# Patient Record
Sex: MALE | Race: BLACK OR AFRICAN AMERICAN
[De-identification: names, ages, dates, MRNs, and addresses within clinical notes are randomized per-mention and may not be internally consistent; named-entity substitution may affect disease eponyms.]

---

## 2019-12-29 ENCOUNTER — HOSPITAL ENCOUNTER (INPATIENT)
Dept: HOSPITAL 8 - NICU
LOS: 61 days | Discharge: ANOTHER HOSPITAL | End: 2020-02-28
Attending: PEDIATRICS | Admitting: PEDIATRICS
Payer: MEDICAID

## 2019-12-29 DIAGNOSIS — Q33.6: ICD-10-CM

## 2019-12-29 DIAGNOSIS — Q78.9: ICD-10-CM

## 2019-12-29 DIAGNOSIS — Q21.1: ICD-10-CM

## 2019-12-29 DIAGNOSIS — Q65.89: ICD-10-CM

## 2019-12-29 DIAGNOSIS — Q25.0: ICD-10-CM

## 2019-12-29 DIAGNOSIS — Q82.8: ICD-10-CM

## 2019-12-29 PROCEDURE — 80047 BASIC METABLC PNL IONIZED CA: CPT

## 2019-12-29 PROCEDURE — 36415 COLL VENOUS BLD VENIPUNCTURE: CPT

## 2019-12-29 PROCEDURE — 94660 CPAP INITIATION&MGMT: CPT

## 2019-12-29 PROCEDURE — A9541 TC99M SULFUR COLLOID: HCPCS

## 2019-12-29 PROCEDURE — 84295 ASSAY OF SERUM SODIUM: CPT

## 2019-12-29 PROCEDURE — 80076 HEPATIC FUNCTION PANEL: CPT

## 2019-12-29 PROCEDURE — 84132 ASSAY OF SERUM POTASSIUM: CPT

## 2019-12-29 PROCEDURE — 81229 CYTOG ALYS CHRML ABNR SNPCGH: CPT

## 2019-12-29 PROCEDURE — 83735 ASSAY OF MAGNESIUM: CPT

## 2019-12-29 PROCEDURE — 88289 CHROMOSOME STUDY ADDITIONAL: CPT

## 2019-12-29 PROCEDURE — 87186 SC STD MICRODIL/AGAR DIL: CPT

## 2019-12-29 PROCEDURE — 82248 BILIRUBIN DIRECT: CPT

## 2019-12-29 PROCEDURE — 84478 ASSAY OF TRIGLYCERIDES: CPT

## 2019-12-29 PROCEDURE — 93325 DOPPLER ECHO COLOR FLOW MAPG: CPT

## 2019-12-29 PROCEDURE — 72146 MRI CHEST SPINE W/O DYE: CPT

## 2019-12-29 PROCEDURE — 85014 HEMATOCRIT: CPT

## 2019-12-29 PROCEDURE — 74018 RADEX ABDOMEN 1 VIEW: CPT

## 2019-12-29 PROCEDURE — 87205 SMEAR GRAM STAIN: CPT

## 2019-12-29 PROCEDURE — 87081 CULTURE SCREEN ONLY: CPT

## 2019-12-29 PROCEDURE — 87077 CULTURE AEROBIC IDENTIFY: CPT

## 2019-12-29 PROCEDURE — 71045 X-RAY EXAM CHEST 1 VIEW: CPT

## 2019-12-29 PROCEDURE — 93321 DOPPLER ECHO F-UP/LMTD STD: CPT

## 2019-12-29 PROCEDURE — 82330 ASSAY OF CALCIUM: CPT

## 2019-12-29 PROCEDURE — 72040 X-RAY EXAM NECK SPINE 2-3 VW: CPT

## 2019-12-29 PROCEDURE — 82247 BILIRUBIN TOTAL: CPT

## 2019-12-29 PROCEDURE — 80048 BASIC METABOLIC PNL TOTAL CA: CPT

## 2019-12-29 PROCEDURE — 78264 GASTRIC EMPTYING IMG STUDY: CPT

## 2019-12-29 PROCEDURE — 82947 ASSAY GLUCOSE BLOOD QUANT: CPT

## 2019-12-29 PROCEDURE — 84075 ASSAY ALKALINE PHOSPHATASE: CPT

## 2019-12-29 PROCEDURE — 88262 CHROMOSOME ANALYSIS 15-20: CPT

## 2019-12-29 PROCEDURE — 82803 BLOOD GASES ANY COMBINATION: CPT

## 2019-12-29 PROCEDURE — 86756 RESPIRATORY VIRUS ANTIBODY: CPT

## 2019-12-29 PROCEDURE — 85025 COMPLETE CBC W/AUTO DIFF WBC: CPT

## 2019-12-29 PROCEDURE — 84100 ASSAY OF PHOSPHORUS: CPT

## 2019-12-29 PROCEDURE — 80307 DRUG TEST PRSMV CHEM ANLYZR: CPT

## 2019-12-29 PROCEDURE — 70551 MRI BRAIN STEM W/O DYE: CPT

## 2019-12-29 PROCEDURE — 87147 CULTURE TYPE IMMUNOLOGIC: CPT

## 2019-12-29 PROCEDURE — 82040 ASSAY OF SERUM ALBUMIN: CPT

## 2019-12-29 PROCEDURE — 87070 CULTURE OTHR SPECIMN AEROBIC: CPT

## 2019-12-29 PROCEDURE — 88230 TISSUE CULTURE LYMPHOCYTE: CPT

## 2019-12-29 PROCEDURE — 74240 X-RAY XM UPR GI TRC 1CNTRST: CPT

## 2019-12-29 PROCEDURE — 82962 GLUCOSE BLOOD TEST: CPT

## 2019-12-29 PROCEDURE — 93303 ECHO TRANSTHORACIC: CPT

## 2019-12-29 PROCEDURE — 76506 ECHO EXAM OF HEAD: CPT

## 2019-12-29 PROCEDURE — 70250 X-RAY EXAM OF SKULL: CPT

## 2019-12-29 PROCEDURE — 76700 US EXAM ABDOM COMPLETE: CPT

## 2020-01-01 VITALS — DIASTOLIC BLOOD PRESSURE: 25 MMHG | SYSTOLIC BLOOD PRESSURE: 53 MMHG

## 2020-01-01 VITALS — DIASTOLIC BLOOD PRESSURE: 49 MMHG | SYSTOLIC BLOOD PRESSURE: 84 MMHG

## 2020-01-01 LAB
<PLATELET ESTIMATE>: (no result)
<PLATELET ESTIMATE>: ADEQUATE
<PLT MORPHOLOGY>: (no result)
ALBUMIN SERPL-MCNC: 2.4 G/DL (ref 3.4–5)
ALBUMIN SERPL-MCNC: 2.5 G/DL (ref 3.4–5)
ALBUMIN SERPL-MCNC: 2.5 G/DL (ref 3.4–5)
ALBUMIN SERPL-MCNC: 2.6 G/DL (ref 3.4–5)
ALBUMIN SERPL-MCNC: 2.7 G/DL (ref 3.4–5)
ALBUMIN SERPL-MCNC: 3 G/DL (ref 3.4–5)
ALBUMIN SERPL-MCNC: 3.3 G/DL (ref 3.4–5)
ALP SERPL-CCNC: 191 U/L (ref 45–800)
ALP SERPL-CCNC: 201 U/L (ref 45–800)
ALP SERPL-CCNC: 225 U/L (ref 45–800)
ALP SERPL-CCNC: 227 U/L (ref 45–800)
ALP SERPL-CCNC: 243 U/L (ref 45–800)
ALP SERPL-CCNC: 298 U/L (ref 45–800)
ALP SERPL-CCNC: 393 U/L (ref 45–800)
ALT SERPL-CCNC: 14 U/L (ref 12–78)
ANION GAP SERPL CALC-SCNC: 3 MMOL/L (ref 5–15)
ANION GAP SERPL CALC-SCNC: 4 MMOL/L (ref 5–15)
ANION GAP SERPL CALC-SCNC: 4 MMOL/L (ref 5–15)
ANION GAP SERPL CALC-SCNC: 7 MMOL/L (ref 5–15)
ANISOCYTOSIS BLD QL SMEAR: (no result)
ANISOCYTOSIS BLD QL SMEAR: (no result)
BAND#(MANUAL): 0.38 X10^3/UL
BAND#(MANUAL): 0.5 X10^3/UL
BASOPHILS NFR BLD MANUAL: 1 % (ref 0–1)
BASOS#(MANUAL): 0.19 X10^3/UL (ref 0–0.6)
BILIRUB SERPL-MCNC: 0.3 MG/DL (ref 0.2–1)
BILIRUB SERPL-MCNC: 0.3 MG/DL (ref 0.2–1)
BILIRUB SERPL-MCNC: 11.7 MG/DL (ref 0.1–10)
BILIRUB SERPL-MCNC: 5 MG/DL (ref 0.1–10)
BILIRUB SERPL-MCNC: 7.2 MG/DL (ref 0.1–10)
BILIRUB SERPL-MCNC: 7.7 MG/DL (ref 0.1–10)
BILIRUB SERPL-MCNC: 9.1 MG/DL (ref 0.1–10)
CALCIUM SERPL-MCNC: 10 MG/DL (ref 8.5–10.1)
CALCIUM SERPL-MCNC: 10.3 MG/DL (ref 8.5–10.1)
CALCIUM SERPL-MCNC: 8.4 MG/DL (ref 8.5–10.1)
CALCIUM SERPL-MCNC: 9.1 MG/DL (ref 8.5–10.1)
CALCIUM SERPL-MCNC: 9.4 MG/DL (ref 8.5–10.1)
CALCIUM SERPL-MCNC: 9.4 MG/DL (ref 8.5–10.1)
CHLORIDE SERPL-SCNC: 103 MMOL/L (ref 98–107)
CHLORIDE SERPL-SCNC: 110 MMOL/L (ref 98–107)
CHLORIDE SERPL-SCNC: 111 MMOL/L (ref 98–107)
CHLORIDE SERPL-SCNC: 112 MMOL/L (ref 98–107)
CHLORIDE SERPL-SCNC: 115 MMOL/L (ref 98–107)
CHLORIDE SERPL-SCNC: 97 MMOL/L (ref 98–107)
CREAT SERPL-MCNC: < 0.15 MG/DL (ref 0.7–1.3)
EOS#(MANUAL): 0.75 X10^3/UL (ref 0.4–1.1)
EOS#(MANUAL): 1.15 X10^3/UL (ref 0–0.9)
EOS% (MANUAL): 3 % (ref 1–7)
EOS% (MANUAL): 6 % (ref 1–7)
ERYTHROCYTE [DISTWIDTH] IN BLOOD BY AUTOMATED COUNT: 16.3 % (ref 9.4–14.8)
ERYTHROCYTE [DISTWIDTH] IN BLOOD BY AUTOMATED COUNT: 20.4 % (ref 13.9–17.4)
LG PLATELETS BLD QL SMEAR: (no result)
LYMPH#(MANUAL): 6.28 X10^3/UL (ref 2–17)
LYMPH#(MANUAL): 7.64 X10^3/UL (ref 2–12)
LYMPHS% (MANUAL): 25 % (ref 45–75)
LYMPHS% (MANUAL): 40 % (ref 28–48)
MACROCYTES BLD QL SMEAR: (no result)
MCH RBC QN AUTO: 29.7 PG (ref 27.5–34.5)
MCH RBC QN AUTO: 31.8 PG (ref 32.6–37.6)
MCHC RBC AUTO-ENTMCNC: 32.4 G/DL (ref 31.8–34.8)
MCHC RBC AUTO-ENTMCNC: 32.9 G/DL (ref 33.2–36.2)
MCV RBC AUTO: 90.1 FL (ref 89–90)
MCV RBC AUTO: 97.9 FL (ref 99–110)
MD: YES
MD: YES
METAMYELOCYTES# (MANUAL): 0.19 X10^3/UL (ref 0–0)
METAMYELOCYTES% (MANUAL): 1 % (ref 0–1)
MONOS#(MANUAL): 1.53 X10^3/UL (ref 0.4–3.1)
MONOS#(MANUAL): 2.76 X10^3/UL (ref 0.3–2.7)
MONOS% (MANUAL): 11 % (ref 2–9)
MONOS% (MANUAL): 8 % (ref 2–9)
NEUTS BAND NFR BLD: 2 % (ref 0–7)
NEUTS BAND NFR BLD: 2 % (ref 0–7)
NRBC % (MANUAL): 3 % (ref 0–1)
OVALOCYTES BLD QL SMEAR: (no result)
PLATELET # BLD AUTO: 324 X10^3/UL (ref 130–400)
PLATELET # BLD AUTO: 476 X10^3/UL (ref 130–400)
PMV BLD AUTO: 7.6 FL (ref 7.4–10.4)
PMV BLD AUTO: 8.4 FL (ref 7.4–10.4)
POLYCHROMASIA BLD QL SMEAR: (no result)
PROT SERPL-MCNC: 4.8 G/DL (ref 6.4–8.2)
RBC # BLD AUTO: 4.21 X10^6/UL (ref 3.8–5.6)
RBC # BLD AUTO: 4.58 X10^6/UL (ref 4.47–5.95)
SCHISTOCYTES BLD QL SMEAR: (no result)
SEG#(MANUAL): 14.81 X10^3/UL (ref 1–10)
SEG#(MANUAL): 8.02 X10^3/UL (ref 5–28)
SEGS% (MANUAL): 42 % (ref 35–65)
SEGS% (MANUAL): 59 % (ref 15–35)
TARGETS BLD QL SMEAR: (no result)
TRIGL SERPL-MCNC: 116 MG/DL (ref 50–200)
TRIGL SERPL-MCNC: 25 MG/DL (ref 50–200)
TRIGL SERPL-MCNC: 31 MG/DL (ref 50–200)
TRIGL SERPL-MCNC: 65 MG/DL (ref 50–200)
TRIGL SERPL-MCNC: 86 MG/DL (ref 50–200)
TRIGL SERPL-MCNC: 88 MG/DL (ref 50–200)

## 2020-01-01 PROCEDURE — 02HV33Z INSERTION OF INFUSION DEVICE INTO SUPERIOR VENA CAVA, PERCUTANEOUS APPROACH: ICD-10-PCS | Performed by: PEDIATRICS

## 2020-01-01 PROCEDURE — 5A09557 ASSISTANCE WITH RESPIRATORY VENTILATION, GREATER THAN 96 CONSECUTIVE HOURS, CONTINUOUS POSITIVE AIRWAY PRESSURE: ICD-10-PCS | Performed by: PEDIATRICS

## 2020-01-01 PROCEDURE — 02H633Z INSERTION OF INFUSION DEVICE INTO RIGHT ATRIUM, PERCUTANEOUS APPROACH: ICD-10-PCS | Performed by: RADIOLOGY

## 2020-01-01 RX ADMIN — SODIUM CHLORIDE SCH MEQ: 234 INJECTION, SOLUTION, CONCENTRATE INTRAVENOUS at 05:36

## 2020-01-01 RX ADMIN — ACETAMINOPHEN PRN MG: 160 SOLUTION ORAL at 03:57

## 2020-01-01 RX ADMIN — SODIUM CHLORIDE SCH ML: 450 INJECTION, SOLUTION INTRAVENOUS at 17:30

## 2020-01-01 RX ADMIN — BACITRACIN ZINC AND POLYMYXIN B SULFATES SCH APPLIC: 500; 10000 OINTMENT OPHTHALMIC at 06:29

## 2020-01-01 RX ADMIN — SODIUM CHLORIDE, PRESERVATIVE FREE SCH ML: 5 INJECTION INTRAVENOUS at 08:13

## 2020-01-01 RX ADMIN — Medication SCH MLS/HR: at 17:07

## 2020-01-01 RX ADMIN — SODIUM CHLORIDE SCH MEQ: 234 INJECTION, SOLUTION, CONCENTRATE INTRAVENOUS at 11:37

## 2020-01-01 RX ADMIN — SODIUM CHLORIDE SCH MEQ: 234 INJECTION, SOLUTION, CONCENTRATE INTRAVENOUS at 17:00

## 2020-01-01 RX ADMIN — SODIUM CHLORIDE, PRESERVATIVE FREE SCH ML: 5 INJECTION INTRAVENOUS at 20:26

## 2020-01-01 RX ADMIN — Medication SCH MLS/HR: at 16:14

## 2020-01-01 RX ADMIN — Medication SCH MLS/HR: at 14:47

## 2020-01-01 RX ADMIN — PEDIATRIC MULTIPLE VITAMINS W/ IRON DROPS 10 MG/ML SCH ML: 10 SOLUTION at 08:54

## 2020-01-01 RX ADMIN — ACETAMINOPHEN PRN MG: 160 SOLUTION ORAL at 16:16

## 2020-01-01 RX ADMIN — SODIUM CHLORIDE SCH MEQ: 234 INJECTION, SOLUTION, CONCENTRATE INTRAVENOUS at 05:31

## 2020-01-01 RX ADMIN — BACITRACIN ZINC AND POLYMYXIN B SULFATES SCH APPLIC: 500; 10000 OINTMENT OPHTHALMIC at 00:11

## 2020-01-01 RX ADMIN — Medication SCH MLS/HR: at 12:45

## 2020-01-01 RX ADMIN — SODIUM CHLORIDE SCH MEQ: 234 INJECTION, SOLUTION, CONCENTRATE INTRAVENOUS at 00:30

## 2020-01-01 RX ADMIN — PEDIATRIC MULTIPLE VITAMINS W/ IRON DROPS 10 MG/ML SCH ML: 10 SOLUTION at 08:38

## 2020-01-01 RX ADMIN — PEDIATRIC MULTIPLE VITAMINS W/ IRON DROPS 10 MG/ML SCH ML: 10 SOLUTION at 08:51

## 2020-01-01 RX ADMIN — SODIUM CHLORIDE, PRESERVATIVE FREE SCH ML: 5 INJECTION INTRAVENOUS at 06:13

## 2020-01-01 RX ADMIN — SODIUM CHLORIDE SCH MEQ: 234 INJECTION, SOLUTION, CONCENTRATE INTRAVENOUS at 23:42

## 2020-01-01 RX ADMIN — SODIUM CHLORIDE SCH MEQ: 234 INJECTION, SOLUTION, CONCENTRATE INTRAVENOUS at 11:10

## 2020-01-01 RX ADMIN — SODIUM CHLORIDE SCH MEQ: 234 INJECTION, SOLUTION, CONCENTRATE INTRAVENOUS at 22:29

## 2020-01-01 RX ADMIN — Medication SCH MLS/HR: at 14:38

## 2020-01-01 RX ADMIN — SODIUM CHLORIDE, PRESERVATIVE FREE SCH ML: 5 INJECTION INTRAVENOUS at 13:54

## 2020-01-01 RX ADMIN — Medication SCH MLS/HR: at 09:30

## 2020-01-01 RX ADMIN — SODIUM CHLORIDE SCH MEQ: 234 INJECTION, SOLUTION, CONCENTRATE INTRAVENOUS at 05:45

## 2020-01-01 RX ADMIN — BACITRACIN ZINC AND POLYMYXIN B SULFATES SCH APPLIC: 500; 10000 OINTMENT OPHTHALMIC at 17:41

## 2020-01-01 RX ADMIN — SODIUM CHLORIDE, PRESERVATIVE FREE SCH ML: 5 INJECTION INTRAVENOUS at 06:58

## 2020-01-01 RX ADMIN — SODIUM CHLORIDE, PRESERVATIVE FREE SCH ML: 5 INJECTION INTRAVENOUS at 08:05

## 2020-01-01 RX ADMIN — PEDIATRIC MULTIPLE VITAMINS W/ IRON DROPS 10 MG/ML SCH ML: 10 SOLUTION at 08:42

## 2020-01-01 RX ADMIN — SODIUM CHLORIDE, PRESERVATIVE FREE SCH ML: 5 INJECTION INTRAVENOUS at 17:30

## 2020-01-01 RX ADMIN — SODIUM CHLORIDE SCH MEQ: 234 INJECTION, SOLUTION, CONCENTRATE INTRAVENOUS at 23:47

## 2020-01-01 RX ADMIN — SODIUM CHLORIDE SCH MEQ: 234 INJECTION, SOLUTION, CONCENTRATE INTRAVENOUS at 17:30

## 2020-01-01 RX ADMIN — SODIUM CHLORIDE SCH MEQ: 234 INJECTION, SOLUTION, CONCENTRATE INTRAVENOUS at 11:30

## 2020-01-01 RX ADMIN — SODIUM CHLORIDE SCH MEQ: 234 INJECTION, SOLUTION, CONCENTRATE INTRAVENOUS at 11:51

## 2020-01-01 RX ADMIN — BACITRACIN ZINC AND POLYMYXIN B SULFATES SCH APPLIC: 500; 10000 OINTMENT OPHTHALMIC at 05:42

## 2020-01-01 RX ADMIN — SODIUM CHLORIDE SCH MEQ: 234 INJECTION, SOLUTION, CONCENTRATE INTRAVENOUS at 11:57

## 2020-01-01 RX ADMIN — SODIUM CHLORIDE, PRESERVATIVE FREE SCH ML: 5 INJECTION INTRAVENOUS at 15:16

## 2020-01-01 RX ADMIN — SODIUM CHLORIDE, PRESERVATIVE FREE SCH ML: 5 INJECTION INTRAVENOUS at 20:09

## 2020-01-01 RX ADMIN — SODIUM CHLORIDE, PRESERVATIVE FREE SCH ML: 5 INJECTION INTRAVENOUS at 11:30

## 2020-01-01 RX ADMIN — SODIUM CHLORIDE SCH MEQ: 234 INJECTION, SOLUTION, CONCENTRATE INTRAVENOUS at 11:42

## 2020-01-01 RX ADMIN — SODIUM CHLORIDE SCH MEQ: 234 INJECTION, SOLUTION, CONCENTRATE INTRAVENOUS at 05:42

## 2020-01-01 RX ADMIN — SODIUM CHLORIDE, PRESERVATIVE FREE SCH ML: 5 INJECTION INTRAVENOUS at 23:32

## 2020-01-01 RX ADMIN — PEDIATRIC MULTIPLE VITAMINS W/ IRON DROPS 10 MG/ML SCH ML: 10 SOLUTION at 08:21

## 2020-01-01 RX ADMIN — SODIUM CHLORIDE SCH MEQ: 234 INJECTION, SOLUTION, CONCENTRATE INTRAVENOUS at 23:41

## 2020-01-01 RX ADMIN — SMOFLIPID SCH MLS/HR: 6; 6; 5; 3 INJECTION, EMULSION INTRAVENOUS at 16:08

## 2020-01-01 RX ADMIN — BACITRACIN ZINC AND POLYMYXIN B SULFATES SCH APPLIC: 500; 10000 OINTMENT OPHTHALMIC at 11:42

## 2020-01-01 RX ADMIN — ACETAMINOPHEN PRN MG: 160 SOLUTION ORAL at 05:05

## 2020-01-01 RX ADMIN — PEDIATRIC MULTIPLE VITAMINS W/ IRON DROPS 10 MG/ML SCH ML: 10 SOLUTION at 08:11

## 2020-01-01 RX ADMIN — Medication PRN EACH: at 14:46

## 2020-01-01 RX ADMIN — Medication SCH MLS/HR: at 16:07

## 2020-01-01 RX ADMIN — SODIUM CHLORIDE SCH MEQ: 234 INJECTION, SOLUTION, CONCENTRATE INTRAVENOUS at 11:41

## 2020-01-01 RX ADMIN — Medication PRN EACH: at 17:07

## 2020-01-01 RX ADMIN — SODIUM CHLORIDE SCH MEQ: 234 INJECTION, SOLUTION, CONCENTRATE INTRAVENOUS at 00:10

## 2020-01-01 RX ADMIN — PEDIATRIC MULTIPLE VITAMINS W/ IRON DROPS 10 MG/ML SCH ML: 10 SOLUTION at 08:45

## 2020-01-01 RX ADMIN — PEDIATRIC MULTIPLE VITAMINS W/ IRON DROPS 10 MG/ML SCH ML: 10 SOLUTION at 08:24

## 2020-01-01 RX ADMIN — BACITRACIN ZINC AND POLYMYXIN B SULFATES SCH APPLIC: 500; 10000 OINTMENT OPHTHALMIC at 01:00

## 2020-01-01 RX ADMIN — SODIUM CHLORIDE, PRESERVATIVE FREE SCH ML: 5 INJECTION INTRAVENOUS at 21:18

## 2020-01-01 RX ADMIN — BACITRACIN ZINC AND POLYMYXIN B SULFATES SCH APPLIC: 500; 10000 OINTMENT OPHTHALMIC at 11:48

## 2020-01-01 RX ADMIN — BACITRACIN ZINC AND POLYMYXIN B SULFATES SCH APPLIC: 500; 10000 OINTMENT OPHTHALMIC at 05:31

## 2020-01-01 RX ADMIN — PEDIATRIC MULTIPLE VITAMINS W/ IRON DROPS 10 MG/ML SCH ML: 10 SOLUTION at 09:19

## 2020-01-01 RX ADMIN — SMOFLIPID SCH MLS/HR: 6; 6; 5; 3 INJECTION, EMULSION INTRAVENOUS at 17:43

## 2020-01-01 RX ADMIN — SODIUM CHLORIDE SCH MEQ: 234 INJECTION, SOLUTION, CONCENTRATE INTRAVENOUS at 00:56

## 2020-01-01 RX ADMIN — ACETAMINOPHEN PRN MG: 160 SOLUTION ORAL at 21:18

## 2020-01-01 RX ADMIN — SODIUM CHLORIDE SCH MEQ: 234 INJECTION, SOLUTION, CONCENTRATE INTRAVENOUS at 11:34

## 2020-01-01 RX ADMIN — SMOFLIPID SCH MLS/HR: 6; 6; 5; 3 INJECTION, EMULSION INTRAVENOUS at 17:07

## 2020-01-01 RX ADMIN — SODIUM CHLORIDE SCH ML: 450 INJECTION, SOLUTION INTRAVENOUS at 11:30

## 2020-01-01 RX ADMIN — BACITRACIN ZINC AND POLYMYXIN B SULFATES SCH APPLIC: 500; 10000 OINTMENT OPHTHALMIC at 00:24

## 2020-01-01 RX ADMIN — PEDIATRIC MULTIPLE VITAMINS W/ IRON DROPS 10 MG/ML SCH ML: 10 SOLUTION at 09:01

## 2020-01-01 RX ADMIN — SODIUM CHLORIDE SCH MEQ: 234 INJECTION, SOLUTION, CONCENTRATE INTRAVENOUS at 17:35

## 2020-01-01 RX ADMIN — Medication SCH MLS/HR: at 13:49

## 2020-01-01 RX ADMIN — SODIUM CHLORIDE SCH MEQ: 234 INJECTION, SOLUTION, CONCENTRATE INTRAVENOUS at 17:26

## 2020-01-01 RX ADMIN — BACITRACIN ZINC AND POLYMYXIN B SULFATES SCH APPLIC: 500; 10000 OINTMENT OPHTHALMIC at 18:01

## 2020-01-01 RX ADMIN — BACITRACIN ZINC AND POLYMYXIN B SULFATES SCH APPLIC: 500; 10000 OINTMENT OPHTHALMIC at 08:30

## 2020-01-01 RX ADMIN — SODIUM CHLORIDE SCH MEQ: 234 INJECTION, SOLUTION, CONCENTRATE INTRAVENOUS at 05:00

## 2020-01-01 RX ADMIN — SODIUM CHLORIDE, PRESERVATIVE FREE SCH ML: 5 INJECTION INTRAVENOUS at 03:40

## 2020-01-01 RX ADMIN — SODIUM CHLORIDE, PRESERVATIVE FREE SCH ML: 5 INJECTION INTRAVENOUS at 21:49

## 2020-01-01 RX ADMIN — BACITRACIN ZINC AND POLYMYXIN B SULFATES SCH APPLIC: 500; 10000 OINTMENT OPHTHALMIC at 21:26

## 2020-01-01 RX ADMIN — SODIUM CHLORIDE, PRESERVATIVE FREE SCH ML: 5 INJECTION INTRAVENOUS at 07:55

## 2020-01-01 RX ADMIN — SODIUM CHLORIDE SCH MEQ: 234 INJECTION, SOLUTION, CONCENTRATE INTRAVENOUS at 18:01

## 2020-01-01 RX ADMIN — PEDIATRIC MULTIPLE VITAMINS W/ IRON DROPS 10 MG/ML SCH ML: 10 SOLUTION at 08:43

## 2020-01-01 RX ADMIN — SODIUM CHLORIDE, PRESERVATIVE FREE SCH ML: 5 INJECTION INTRAVENOUS at 02:16

## 2020-01-01 RX ADMIN — SODIUM CHLORIDE SCH MEQ: 234 INJECTION, SOLUTION, CONCENTRATE INTRAVENOUS at 11:21

## 2020-01-01 RX ADMIN — SODIUM CHLORIDE SCH MEQ: 234 INJECTION, SOLUTION, CONCENTRATE INTRAVENOUS at 11:17

## 2020-01-01 RX ADMIN — PEDIATRIC MULTIPLE VITAMINS W/ IRON DROPS 10 MG/ML SCH ML: 10 SOLUTION at 08:12

## 2020-01-01 RX ADMIN — PEDIATRIC MULTIPLE VITAMINS W/ IRON DROPS 10 MG/ML SCH ML: 10 SOLUTION at 08:33

## 2020-01-01 RX ADMIN — BACITRACIN ZINC AND POLYMYXIN B SULFATES SCH APPLIC: 500; 10000 OINTMENT OPHTHALMIC at 11:41

## 2020-01-01 RX ADMIN — PEDIATRIC MULTIPLE VITAMINS W/ IRON DROPS 10 MG/ML SCH ML: 10 SOLUTION at 10:15

## 2020-01-01 RX ADMIN — PEDIATRIC MULTIPLE VITAMINS W/ IRON DROPS 10 MG/ML SCH ML: 10 SOLUTION at 08:49

## 2020-01-01 RX ADMIN — SODIUM CHLORIDE SCH MEQ: 234 INJECTION, SOLUTION, CONCENTRATE INTRAVENOUS at 05:23

## 2020-01-01 RX ADMIN — SODIUM CHLORIDE SCH MEQ: 234 INJECTION, SOLUTION, CONCENTRATE INTRAVENOUS at 23:16

## 2020-01-01 RX ADMIN — PEDIATRIC MULTIPLE VITAMINS W/ IRON DROPS 10 MG/ML SCH ML: 10 SOLUTION at 08:39

## 2020-01-01 RX ADMIN — SODIUM CHLORIDE, PRESERVATIVE FREE SCH ML: 5 INJECTION INTRAVENOUS at 20:44

## 2020-01-01 RX ADMIN — PEDIATRIC MULTIPLE VITAMINS W/ IRON DROPS 10 MG/ML SCH ML: 10 SOLUTION at 08:30

## 2020-01-01 RX ADMIN — SODIUM CHLORIDE, PRESERVATIVE FREE SCH ML: 5 INJECTION INTRAVENOUS at 15:11

## 2020-01-01 RX ADMIN — SODIUM CHLORIDE, PRESERVATIVE FREE SCH ML: 5 INJECTION INTRAVENOUS at 11:42

## 2020-01-01 RX ADMIN — SODIUM CHLORIDE, PRESERVATIVE FREE SCH ML: 5 INJECTION INTRAVENOUS at 14:32

## 2020-01-01 RX ADMIN — SODIUM CHLORIDE, PRESERVATIVE FREE SCH ML: 5 INJECTION INTRAVENOUS at 14:20

## 2020-01-01 RX ADMIN — SODIUM CHLORIDE, PRESERVATIVE FREE SCH ML: 5 INJECTION INTRAVENOUS at 01:56

## 2020-01-01 RX ADMIN — SODIUM CHLORIDE, PRESERVATIVE FREE SCH ML: 5 INJECTION INTRAVENOUS at 14:40

## 2020-01-01 RX ADMIN — SODIUM CHLORIDE, PRESERVATIVE FREE SCH ML: 5 INJECTION INTRAVENOUS at 02:52

## 2020-01-01 RX ADMIN — Medication SCH MLS/HR: at 15:16

## 2020-01-01 RX ADMIN — PEDIATRIC MULTIPLE VITAMINS W/ IRON DROPS 10 MG/ML SCH ML: 10 SOLUTION at 08:55

## 2020-01-01 RX ADMIN — SODIUM CHLORIDE, PRESERVATIVE FREE SCH ML: 5 INJECTION INTRAVENOUS at 00:49

## 2020-01-01 RX ADMIN — BACITRACIN ZINC AND POLYMYXIN B SULFATES SCH APPLIC: 500; 10000 OINTMENT OPHTHALMIC at 11:21

## 2020-01-01 RX ADMIN — SODIUM CHLORIDE SCH MEQ: 234 INJECTION, SOLUTION, CONCENTRATE INTRAVENOUS at 11:20

## 2020-01-01 RX ADMIN — PEDIATRIC MULTIPLE VITAMINS W/ IRON DROPS 10 MG/ML SCH ML: 10 SOLUTION at 08:07

## 2020-01-01 RX ADMIN — SODIUM CHLORIDE, PRESERVATIVE FREE SCH ML: 5 INJECTION INTRAVENOUS at 02:49

## 2020-01-01 RX ADMIN — SODIUM CHLORIDE, PRESERVATIVE FREE SCH ML: 5 INJECTION INTRAVENOUS at 02:38

## 2020-01-01 RX ADMIN — PEDIATRIC MULTIPLE VITAMINS W/ IRON DROPS 10 MG/ML SCH ML: 10 SOLUTION at 09:28

## 2020-01-01 RX ADMIN — PEDIATRIC MULTIPLE VITAMINS W/ IRON DROPS 10 MG/ML SCH ML: 10 SOLUTION at 07:46

## 2020-01-01 RX ADMIN — SODIUM CHLORIDE, PRESERVATIVE FREE SCH ML: 5 INJECTION INTRAVENOUS at 08:08

## 2020-01-01 RX ADMIN — SODIUM CHLORIDE SCH MEQ: 234 INJECTION, SOLUTION, CONCENTRATE INTRAVENOUS at 23:17

## 2020-01-01 RX ADMIN — SODIUM CHLORIDE, PRESERVATIVE FREE SCH ML: 5 INJECTION INTRAVENOUS at 14:30

## 2020-01-01 RX ADMIN — Medication SCH MLS/HR: at 17:43

## 2020-01-01 RX ADMIN — ACETAMINOPHEN PRN MG: 160 SOLUTION ORAL at 21:37

## 2020-01-01 RX ADMIN — Medication PRN EACH: at 17:43

## 2020-01-01 RX ADMIN — SODIUM CHLORIDE, PRESERVATIVE FREE SCH ML: 5 INJECTION INTRAVENOUS at 00:05

## 2020-01-01 RX ADMIN — PEDIATRIC MULTIPLE VITAMINS W/ IRON DROPS 10 MG/ML SCH ML: 10 SOLUTION at 08:13

## 2020-01-01 RX ADMIN — SODIUM CHLORIDE, PRESERVATIVE FREE SCH ML: 5 INJECTION INTRAVENOUS at 08:42

## 2020-01-01 RX ADMIN — SMOFLIPID SCH MLS/HR: 6; 6; 5; 3 INJECTION, EMULSION INTRAVENOUS at 05:41

## 2020-01-01 RX ADMIN — PEDIATRIC MULTIPLE VITAMINS W/ IRON DROPS 10 MG/ML SCH ML: 10 SOLUTION at 08:35

## 2020-01-01 RX ADMIN — BACITRACIN ZINC AND POLYMYXIN B SULFATES SCH APPLIC: 500; 10000 OINTMENT OPHTHALMIC at 23:56

## 2020-01-01 RX ADMIN — ACETAMINOPHEN PRN MG: 160 SOLUTION ORAL at 22:08

## 2020-01-01 RX ADMIN — PEDIATRIC MULTIPLE VITAMINS W/ IRON DROPS 10 MG/ML SCH ML: 10 SOLUTION at 09:29

## 2020-01-01 RX ADMIN — SODIUM CHLORIDE, PRESERVATIVE FREE SCH ML: 5 INJECTION INTRAVENOUS at 07:54

## 2020-01-01 RX ADMIN — SODIUM CHLORIDE, PRESERVATIVE FREE SCH ML: 5 INJECTION INTRAVENOUS at 21:10

## 2020-01-01 RX ADMIN — PEDIATRIC MULTIPLE VITAMINS W/ IRON DROPS 10 MG/ML SCH ML: 10 SOLUTION at 09:05

## 2020-01-01 RX ADMIN — PEDIATRIC MULTIPLE VITAMINS W/ IRON DROPS 10 MG/ML SCH ML: 10 SOLUTION at 11:39

## 2020-01-01 RX ADMIN — SODIUM CHLORIDE SCH MEQ: 234 INJECTION, SOLUTION, CONCENTRATE INTRAVENOUS at 23:57

## 2020-01-01 RX ADMIN — SODIUM CHLORIDE, PRESERVATIVE FREE SCH ML: 5 INJECTION INTRAVENOUS at 17:44

## 2020-01-01 RX ADMIN — PEDIATRIC MULTIPLE VITAMINS W/ IRON DROPS 10 MG/ML SCH ML: 10 SOLUTION at 08:37

## 2020-01-01 RX ADMIN — ACETAMINOPHEN PRN MG: 160 SOLUTION ORAL at 05:02

## 2020-01-01 RX ADMIN — PEDIATRIC MULTIPLE VITAMINS W/ IRON DROPS 10 MG/ML SCH ML: 10 SOLUTION at 09:23

## 2020-01-01 RX ADMIN — SODIUM CHLORIDE SCH MEQ: 234 INJECTION, SOLUTION, CONCENTRATE INTRAVENOUS at 17:09

## 2020-01-01 RX ADMIN — SODIUM CHLORIDE SCH MEQ: 234 INJECTION, SOLUTION, CONCENTRATE INTRAVENOUS at 23:26

## 2020-01-01 RX ADMIN — Medication SCH MLS/HR: at 16:54

## 2020-01-01 RX ADMIN — BACITRACIN ZINC AND POLYMYXIN B SULFATES SCH APPLIC: 500; 10000 OINTMENT OPHTHALMIC at 11:18

## 2020-01-01 RX ADMIN — Medication PRN EACH: at 16:54

## 2020-01-01 RX ADMIN — SODIUM CHLORIDE, PRESERVATIVE FREE SCH ML: 5 INJECTION INTRAVENOUS at 16:37

## 2020-01-01 RX ADMIN — SODIUM CHLORIDE SCH MEQ: 234 INJECTION, SOLUTION, CONCENTRATE INTRAVENOUS at 11:26

## 2020-01-01 RX ADMIN — SODIUM CHLORIDE SCH MEQ: 234 INJECTION, SOLUTION, CONCENTRATE INTRAVENOUS at 00:12

## 2020-01-01 RX ADMIN — PEDIATRIC MULTIPLE VITAMINS W/ IRON DROPS 10 MG/ML SCH ML: 10 SOLUTION at 08:47

## 2020-01-01 RX ADMIN — SODIUM CHLORIDE, PRESERVATIVE FREE SCH ML: 5 INJECTION INTRAVENOUS at 05:30

## 2020-01-01 RX ADMIN — SODIUM CHLORIDE SCH MEQ: 234 INJECTION, SOLUTION, CONCENTRATE INTRAVENOUS at 17:10

## 2020-01-01 RX ADMIN — PEDIATRIC MULTIPLE VITAMINS W/ IRON DROPS 10 MG/ML SCH ML: 10 SOLUTION at 09:38

## 2020-01-01 RX ADMIN — PEDIATRIC MULTIPLE VITAMINS W/ IRON DROPS 10 MG/ML SCH ML: 10 SOLUTION at 08:29

## 2020-01-01 RX ADMIN — SODIUM CHLORIDE SCH MEQ: 234 INJECTION, SOLUTION, CONCENTRATE INTRAVENOUS at 23:05

## 2020-01-01 RX ADMIN — SODIUM CHLORIDE SCH MEQ: 234 INJECTION, SOLUTION, CONCENTRATE INTRAVENOUS at 17:28

## 2020-01-01 RX ADMIN — PEDIATRIC MULTIPLE VITAMINS W/ IRON DROPS 10 MG/ML SCH ML: 10 SOLUTION at 11:38

## 2020-01-01 RX ADMIN — PEDIATRIC MULTIPLE VITAMINS W/ IRON DROPS 10 MG/ML SCH ML: 10 SOLUTION at 08:36

## 2020-01-01 RX ADMIN — BACITRACIN ZINC AND POLYMYXIN B SULFATES SCH APPLIC: 500; 10000 OINTMENT OPHTHALMIC at 17:10

## 2020-01-01 RX ADMIN — SODIUM CHLORIDE SCH MEQ: 234 INJECTION, SOLUTION, CONCENTRATE INTRAVENOUS at 23:27

## 2020-01-01 RX ADMIN — PEDIATRIC MULTIPLE VITAMINS W/ IRON DROPS 10 MG/ML SCH ML: 10 SOLUTION at 08:26

## 2020-01-01 RX ADMIN — SODIUM CHLORIDE, PRESERVATIVE FREE SCH ML: 5 INJECTION INTRAVENOUS at 11:56

## 2020-01-01 RX ADMIN — SODIUM CHLORIDE, PRESERVATIVE FREE SCH ML: 5 INJECTION INTRAVENOUS at 05:39

## 2020-01-01 RX ADMIN — SODIUM CHLORIDE SCH MEQ: 234 INJECTION, SOLUTION, CONCENTRATE INTRAVENOUS at 18:10

## 2020-01-01 RX ADMIN — SMOFLIPID SCH MLS/HR: 6; 6; 5; 3 INJECTION, EMULSION INTRAVENOUS at 12:45

## 2020-01-01 RX ADMIN — PEDIATRIC MULTIPLE VITAMINS W/ IRON DROPS 10 MG/ML SCH ML: 10 SOLUTION at 08:56

## 2020-01-01 RX ADMIN — SODIUM CHLORIDE SCH MEQ: 234 INJECTION, SOLUTION, CONCENTRATE INTRAVENOUS at 11:23

## 2020-01-01 RX ADMIN — SODIUM CHLORIDE SCH MEQ: 234 INJECTION, SOLUTION, CONCENTRATE INTRAVENOUS at 23:35

## 2020-01-01 RX ADMIN — SODIUM CHLORIDE, PRESERVATIVE FREE SCH ML: 5 INJECTION INTRAVENOUS at 08:57

## 2020-01-01 RX ADMIN — BACITRACIN ZINC AND POLYMYXIN B SULFATES SCH APPLIC: 500; 10000 OINTMENT OPHTHALMIC at 17:42

## 2020-01-01 RX ADMIN — Medication PRN EACH: at 16:07

## 2020-01-01 RX ADMIN — SODIUM CHLORIDE, PRESERVATIVE FREE SCH ML: 5 INJECTION INTRAVENOUS at 06:31

## 2020-01-01 RX ADMIN — PEDIATRIC MULTIPLE VITAMINS W/ IRON DROPS 10 MG/ML SCH ML: 10 SOLUTION at 08:34

## 2020-01-01 RX ADMIN — SODIUM CHLORIDE, PRESERVATIVE FREE SCH ML: 5 INJECTION INTRAVENOUS at 23:30

## 2020-01-01 RX ADMIN — SODIUM CHLORIDE SCH MEQ: 234 INJECTION, SOLUTION, CONCENTRATE INTRAVENOUS at 05:14

## 2020-01-01 RX ADMIN — BACITRACIN ZINC AND POLYMYXIN B SULFATES SCH APPLIC: 500; 10000 OINTMENT OPHTHALMIC at 05:14

## 2020-01-01 RX ADMIN — SODIUM CHLORIDE, PRESERVATIVE FREE SCH ML: 5 INJECTION INTRAVENOUS at 20:03

## 2020-01-01 RX ADMIN — SODIUM CHLORIDE, PRESERVATIVE FREE SCH ML: 5 INJECTION INTRAVENOUS at 03:10

## 2020-01-01 RX ADMIN — PEDIATRIC MULTIPLE VITAMINS W/ IRON DROPS 10 MG/ML SCH ML: 10 SOLUTION at 08:58

## 2020-01-01 RX ADMIN — Medication PRN EACH: at 12:46

## 2020-01-01 RX ADMIN — SODIUM CHLORIDE, PRESERVATIVE FREE SCH ML: 5 INJECTION INTRAVENOUS at 07:48

## 2020-01-01 RX ADMIN — BACITRACIN ZINC AND POLYMYXIN B SULFATES SCH APPLIC: 500; 10000 OINTMENT OPHTHALMIC at 11:38

## 2020-01-01 RX ADMIN — SODIUM CHLORIDE SCH MEQ: 234 INJECTION, SOLUTION, CONCENTRATE INTRAVENOUS at 04:25

## 2020-01-01 RX ADMIN — SODIUM CHLORIDE, PRESERVATIVE FREE SCH ML: 5 INJECTION INTRAVENOUS at 20:23

## 2020-01-01 RX ADMIN — SODIUM CHLORIDE, PRESERVATIVE FREE SCH ML: 5 INJECTION INTRAVENOUS at 08:34

## 2020-01-01 RX ADMIN — SODIUM CHLORIDE, PRESERVATIVE FREE SCH ML: 5 INJECTION INTRAVENOUS at 20:27

## 2020-01-01 RX ADMIN — PEDIATRIC MULTIPLE VITAMINS W/ IRON DROPS 10 MG/ML SCH ML: 10 SOLUTION at 08:57

## 2020-01-01 RX ADMIN — BACITRACIN ZINC AND POLYMYXIN B SULFATES SCH APPLIC: 500; 10000 OINTMENT OPHTHALMIC at 23:05

## 2020-01-01 RX ADMIN — BACITRACIN ZINC AND POLYMYXIN B SULFATES SCH APPLIC: 500; 10000 OINTMENT OPHTHALMIC at 23:48

## 2020-01-01 RX ADMIN — BACITRACIN ZINC AND POLYMYXIN B SULFATES SCH APPLIC: 500; 10000 OINTMENT OPHTHALMIC at 05:52

## 2020-01-01 RX ADMIN — PEDIATRIC MULTIPLE VITAMINS W/ IRON DROPS 10 MG/ML SCH ML: 10 SOLUTION at 09:18

## 2020-01-01 RX ADMIN — SODIUM CHLORIDE, PRESERVATIVE FREE SCH ML: 5 INJECTION INTRAVENOUS at 14:15

## 2020-01-01 RX ADMIN — PEDIATRIC MULTIPLE VITAMINS W/ IRON DROPS 10 MG/ML SCH ML: 10 SOLUTION at 08:28

## 2020-01-01 RX ADMIN — SODIUM CHLORIDE SCH MEQ: 234 INJECTION, SOLUTION, CONCENTRATE INTRAVENOUS at 18:04

## 2020-01-01 RX ADMIN — SODIUM CHLORIDE SCH MEQ: 234 INJECTION, SOLUTION, CONCENTRATE INTRAVENOUS at 11:12

## 2020-01-01 RX ADMIN — Medication SCH MLS/HR: at 15:32

## 2020-01-01 RX ADMIN — Medication PRN EACH: at 16:14

## 2020-01-01 RX ADMIN — SODIUM CHLORIDE, PRESERVATIVE FREE SCH ML: 5 INJECTION INTRAVENOUS at 01:54

## 2020-01-01 RX ADMIN — SMOFLIPID SCH MLS/HR: 6; 6; 5; 3 INJECTION, EMULSION INTRAVENOUS at 14:37

## 2020-01-01 NOTE — NUR
3 hrs on/off. 

-------------------------------------------------------------------------------

Addendum: 02/28/20 at 1213 by Polly Healy OT

-------------------------------------------------------------------------------

Amended: Links added.